# Patient Record
Sex: MALE | Race: WHITE | NOT HISPANIC OR LATINO | Employment: OTHER | URBAN - METROPOLITAN AREA
[De-identification: names, ages, dates, MRNs, and addresses within clinical notes are randomized per-mention and may not be internally consistent; named-entity substitution may affect disease eponyms.]

---

## 2019-02-12 ENCOUNTER — HOSPITAL ENCOUNTER (OUTPATIENT)
Facility: MEDICAL CENTER | Age: 59
End: 2019-02-12
Attending: INTERNAL MEDICINE | Admitting: INTERNAL MEDICINE
Payer: COMMERCIAL

## 2019-03-21 ENCOUNTER — APPOINTMENT (OUTPATIENT)
Dept: NEUROLOGY | Facility: MEDICAL CENTER | Age: 59
End: 2019-03-21
Payer: COMMERCIAL

## 2019-06-20 ENCOUNTER — HOSPITAL ENCOUNTER (OUTPATIENT)
Dept: LAB | Facility: MEDICAL CENTER | Age: 59
End: 2019-06-20
Attending: PSYCHIATRY & NEUROLOGY
Payer: COMMERCIAL

## 2019-06-20 ENCOUNTER — HOSPITAL ENCOUNTER (OUTPATIENT)
Dept: RADIOLOGY | Facility: MEDICAL CENTER | Age: 59
End: 2019-06-20

## 2019-06-20 ENCOUNTER — OFFICE VISIT (OUTPATIENT)
Dept: NEUROLOGY | Facility: MEDICAL CENTER | Age: 59
End: 2019-06-20
Payer: COMMERCIAL

## 2019-06-20 VITALS
HEART RATE: 100 BPM | BODY MASS INDEX: 31.15 KG/M2 | RESPIRATION RATE: 16 BRPM | TEMPERATURE: 97.1 F | DIASTOLIC BLOOD PRESSURE: 58 MMHG | OXYGEN SATURATION: 93 % | HEIGHT: 72 IN | SYSTOLIC BLOOD PRESSURE: 102 MMHG | WEIGHT: 230 LBS

## 2019-06-20 DIAGNOSIS — H53.2 DIPLOPIA: ICD-10-CM

## 2019-06-20 DIAGNOSIS — Z86.73 HISTORY OF STROKE: ICD-10-CM

## 2019-06-20 DIAGNOSIS — F10.11 HISTORY OF ALCOHOL ABUSE: ICD-10-CM

## 2019-06-20 DIAGNOSIS — M06.00 SERONEGATIVE RHEUMATOID ARTHRITIS (HCC): ICD-10-CM

## 2019-06-20 DIAGNOSIS — G62.9 NEUROPATHY: ICD-10-CM

## 2019-06-20 DIAGNOSIS — R20.9 SENSORY DISTURBANCE: ICD-10-CM

## 2019-06-20 LAB
CRP SERPL HS-MCNC: 3.7 MG/DL (ref 0–0.75)
ERYTHROCYTE [SEDIMENTATION RATE] IN BLOOD BY WESTERGREN METHOD: 18 MM/HOUR (ref 0–20)
HIV 1+2 AB+HIV1 P24 AG SERPL QL IA: NON REACTIVE
VIT B12 SERPL-MCNC: 663 PG/ML (ref 211–911)

## 2019-06-20 PROCEDURE — 84165 PROTEIN E-PHORESIS SERUM: CPT

## 2019-06-20 PROCEDURE — 84160 ASSAY OF PROTEIN ANY SOURCE: CPT

## 2019-06-20 PROCEDURE — 83921 ORGANIC ACID SINGLE QUANT: CPT

## 2019-06-20 PROCEDURE — 82607 VITAMIN B-12: CPT

## 2019-06-20 PROCEDURE — 87389 HIV-1 AG W/HIV-1&-2 AB AG IA: CPT

## 2019-06-20 PROCEDURE — 86334 IMMUNOFIX E-PHORESIS SERUM: CPT

## 2019-06-20 PROCEDURE — 86038 ANTINUCLEAR ANTIBODIES: CPT

## 2019-06-20 PROCEDURE — 99205 OFFICE O/P NEW HI 60 MIN: CPT | Performed by: PSYCHIATRY & NEUROLOGY

## 2019-06-20 PROCEDURE — 85652 RBC SED RATE AUTOMATED: CPT

## 2019-06-20 PROCEDURE — 82784 ASSAY IGA/IGD/IGG/IGM EACH: CPT

## 2019-06-20 PROCEDURE — 86039 ANTINUCLEAR ANTIBODIES (ANA): CPT

## 2019-06-20 PROCEDURE — 86140 C-REACTIVE PROTEIN: CPT

## 2019-06-20 PROCEDURE — 36415 COLL VENOUS BLD VENIPUNCTURE: CPT

## 2019-06-20 RX ORDER — GABAPENTIN 400 MG/1
400 CAPSULE ORAL 2 TIMES DAILY
COMMUNITY

## 2019-06-20 RX ORDER — PREDNISONE 20 MG/1
TABLET ORAL
COMMUNITY
Start: 2019-04-03 | End: 2019-06-20

## 2019-06-20 RX ORDER — ATORVASTATIN CALCIUM 80 MG/1
TABLET, FILM COATED ORAL
COMMUNITY
Start: 2019-04-25 | End: 2019-06-20

## 2019-06-20 ASSESSMENT — PATIENT HEALTH QUESTIONNAIRE - PHQ9: CLINICAL INTERPRETATION OF PHQ2 SCORE: 0

## 2019-06-20 ASSESSMENT — ENCOUNTER SYMPTOMS
FALLS: 0
ABDOMINAL PAIN: 0
HALLUCINATIONS: 0
BRUISES/BLEEDS EASILY: 0
EYE DISCHARGE: 0
SORE THROAT: 0
FEVER: 0
SHORTNESS OF BREATH: 0
WEIGHT LOSS: 1

## 2019-06-20 NOTE — PROGRESS NOTES
"Chief Complaint   Patient presents with   • New Patient     Neuropathy     Patient is referred by Dr. Anh Hall for initial consult.    History of present illness:  Dani Sarmiento 59 y.o. male presents today for sensations in the lower extremities.   History is obtained from patient and significant other.  and Patient is accompanied by wife Shruti. . Patient is a scattered and limited historian.  Him and his wife tend to disagree at times during history taking.    Duration/timing: as below  Context:   Neuropathy: he has seen Dr. Gayle ~ 2018, now retiring. He has seen Dr. Damon in [place]. Onset of symptoms 2016, started at the bottom of the toes like pins and needles, stable in location until now. He used to drinks 4 beers a day for at least 10 years.    TIA/CVA: 8/2016, he was suffering with vertigo for uncertain amount. Patient was having double vision (one on top of the other, binocular) felt like shade was coming down on his eye (saw ophthalmologist, cleared) lasting for 3 minutes about the same time, about 4 episodes a week, without triggers, can improve with \"stretching his eyes\". He was at PT experiencing some confusion (uncertain about things) lasting for 1 hour.  Diplopia not  fatigable.  Location: Bilateral lower extremity; underneath the toes  Quality: As above  Severity: Mild neuropathy, mild to moderate stroke  Modifying factors: None  Associated signs/symptoms: additional sensations in the bilateral arms and legs described as \"sensitivity to cold\" like things that shouldn't be cold will feel cold. When he touches his sheets, he feels uncomfortable. Onset about 2017, gradual onset, started in the feet and legs and arms and bottom of the neck at the same time, sparing his face. Present all the time. Hands feel like cold all the time. He cannot get an erection for 2 years but he can ejaculate. +\"Swallowing difficulties\" bringing up gooey mucous, needing to chew his food more?. He is in bed 20 " hours a day because he doesn't feel well - tingling in his arms and legs keeps him there. LOC once walking to bathroom 2017.  Denies: bladder incontinence, bowel incontinence, dizziness, headaches, head trauma , weakness, other numbness/tingling, speech disturbance, incoordination, hearing loss, hallucinations, seizures, falls, family hx seizure, febrile seizure as child, snoring/apnea during sleep and HIV or syphilis risk factors, inability to sweat, pain    Patient has tried:  -Gabapentin - 400mg BID, started in 2018, improved by 80%    Past medical history:   Past Medical History:   Diagnosis Date   • Anxiety    • GERD (gastroesophageal reflux disease)    • Hyperlipidemia      Past surgical history:   History reviewed. No pertinent surgical history.    Family history:   Family History   Problem Relation Age of Onset   • Hyperlipidemia Mother    • Rheumatologic Disease Father        Social history:   Social History   • Marital status:      Social History Main Topics   • Smoking status: Never Smoker   • Smokeless tobacco: Never Used   • Alcohol use Yes      Comment: hx of EtOH abuse - 2018   • Drug use: No   • Sexual activity: Yes     Partners: Female     Current medications:   Current Outpatient Prescriptions   Medication   • gabapentin (NEURONTIN) 400 MG Cap   • omeprazole (PRILOSEC) 20 MG delayed-release capsule   • aspirin EC (ECOTRIN) 325 MG Tablet Delayed Response   • atorvastatin (LIPITOR) 80 MG tablet     No current facility-administered medications for this visit.        Medication Allergy:  Allergies   Allergen Reactions   • Penicillins        Review of Systems   Constitutional: Positive for weight loss. Negative for fever.        20 lbs in 2 months   HENT: Negative for sore throat.    Eyes: Negative for discharge.   Respiratory: Negative for shortness of breath.    Cardiovascular: Negative for leg swelling.   Gastrointestinal: Negative for abdominal pain.   Genitourinary: Negative for dysuria.    Musculoskeletal: Negative for falls.   Skin: Negative for rash.   Neurological:        As per HPI   Endo/Heme/Allergies: Does not bruise/bleed easily.   Psychiatric/Behavioral: Negative for hallucinations.       Physical examination:   Vitals:    06/20/19 1303   BP: 102/58   BP Location: Left arm   Patient Position: Sitting   BP Cuff Size: Adult   Pulse: 100   Resp: 16   Temp: 36.2 °C (97.1 °F)   TempSrc: Temporal   SpO2: 93%   Weight: 104.3 kg (230 lb)   Height: 1.829 m (6')     General: Patient in well nourished in no apparent distress.  Eyes: Ophthalmoscopic examination performed but discs cannot be visualized well enough to characterize bilaterally.  HENT: Normocephalic, atraumatic. Mallapatic score 3  Cardiovascular: No lower extremity edema.  Respiratory: Normal respiratory effort.   Skin: No appreciable signs of acute rashes or bruising.   Musculoskeletal: No signs of joint or muscle swelling.   Psychiatric: Pleasant.     NEUROLOGICAL EXAM:   Mental status: Awake, alert and fully oriented to person, place, time and situation. Normal attention, concentration and fund of knowledge for education level.   Speech and language: Speech is fluent without errors and clear.  Cranial nerve exam:  II: Pupils are equally round and reactive to light. Visual fields are intact by confrontation.  III, IV, VI: EOMI, no diplopia, no ptosis.  V: Sensation to light touch is normal over V1-3 distributions bilaterally.  .  VII: Facial movements are symmetrical. There is no facial droop. .  VIII: Hearing intact to soft speech and finger rub bilaterally  IX: Palate elevates symmetrically, uvula is midline. Dysarthria is not present.  XI: Shoulder shrug are symmetrical and strong.   XII: Tongue protrudes midline.    Motor exam:  Muscle tone is normal in all 4 limbs. and No abnormal movements appreciated.    Muscle strength:     Right  Left  Deltoid   5/5  5/5      Biceps   5/5  5/5  Triceps  5/5  5/5   Wrist  extensors 5/5  5/5  Wrist flexors  5/5  5/5     5/5  5/5  Interossei  5/5  5/5  Thenar (APB)  NT/5  NT/5   Hip flexors  5/5  5/5  Quadriceps  5/5  5/5    Hamstrings  5/5  5/5  Dorsiflexors  5/5  5/5  Plantarflexors  5/5  5/5  Toe extension  NT/5  NT/5  NT = not tested    Sensory exam:  Intact to Light touch, Pinprick, Temperature, Vibration and Proprioception in bilateral upper and lower extremity.    Deep tendon reflexes:       Right  Left  Biceps   1/4  1/4  Triceps  1/4  1/4  Brachioradialis 1/4  1/4  Knee jerk  3/4  3/4  Ankle jerk  0/4  0/4   bilateral toes are downgoing to plantar stimulation..    Coordination: shows a normal finger-nose-finger and heel to shin bilaterally.   Gait: Casual gait is normal., Heel walk is normal. and Toe walk is normal.      ANCILLARY DATA REVIEWED:   Labs:  EMG/NCS results were requested and received from Dr. Gayle: Bilateral lower extremities and left upper extremity were evaluated.  Bilateral sural sensory responses were unobtainable.  Otherwise fairly symmetric common peroneal responses and posterior tibial responses.  Left upper extremity NCS of the median motor nerves were unremarkable.  EMG was unremarkable.    I have personally reviewed the patient's waveforms and values.      Imaging:   MRI C-spine without contrast dated October 2018 performed for radiculopathy: Mild diffuse disc bulge at C5-C6 and C6-C7.  Congenitally small central canal with superimposed acquired degenerative changes resulting in slight further encroachment of the central canal at C5-C6.    MRI brain with and without contrast dated August 2016: Performed for ataxia, diplopia, amaurosis fugax, episodic confusion; small focus of diffusion restriction within the right corona radiata raising concern for small acute lacunar infarct.  Mild supratentorial parenchymal atrophy with moderate hyperintense subcortical and periventricular white matter foci most consistent with chronic micro vascular ischemic  change.  Mild parous nasal sinus disease    Carotid bilateral ultrasound August 2016: No significant carotid stenosis    CT head without contrast August 2016, normal    Records reviewed: Referral note personally reviewed.  Patient referred by Dr. Anh Padron.  He is having abnormal sensations in his bilateral lower extremities associated with possible diplopia.  He feels unsteady.  B12 in 2016 was 297.  Rheumatology referral notes also reviewed.  Patient had a recent EMG.  There is a history of seronegative rheumatoid arthritis, methotrexate was recommended but declined.  He has been reportedly bedbound for over a year due to not feeling good.  Patient is a recovering alcoholic.  Possible history of TIA and stroke.        ASSESSMENT AND PLAN:    1. Sensory disturbance: Uncertain etiology.  MRI brain in August 2016 2016 moderate periventricular white matter disease, small stroke; MRI C-spine October 2018 with mild diffuse disc bulge at C5-C7 without spinal cord compromise; normal EMG/NCS April 2018.  Clinical history is not suspicious for allodynia or small fiber neuropathy and seems separate from problem #2.  Neck and brain imaging does not explain his symptoms.  Symptoms not localized to the T-spine.  It is unusual that he states this is the reason keeping him in bed for 20 hours a day.  Cannot rule out somatization. I am his third neurologist - for multiple reasons.  Low suspicion for seizure given the persistent symptoms.  -Repeat EMG/NCS as below to evaluate for possible progression  -Likely monitor long term    2. Neuropathy (HCC): Distal, bottom of the toes bilaterally, suspicious for small fiber neuropathy given his symptoms and physical exam.  Given his chronic history of heavy alcohol use this is likely the etiology however will complete neuropathy work-up as below for reversible causes.  Patient reportedly had an EMG/NCS performed by Dr. Gayle which was unremarkable in April 2018.  MRI brain with  without contrast in August 2016 performed for multiple symptoms such as ataxia diplopia episodic infusions showed a small area of restricted diffusion in the right corona radiata which may indicate a small infarct however would not account for all of his symptoms as described in HPI.  He has not also otherwise unremarkable neurological exam.  No clear red flag symptoms reported.  See problem #1.  - Immunoelectrophoresis, Serum (Immunofixation); Future  - HIV AG/AB COMBO ASSAY SCREENING; Future  - VITAMIN B12; Future  - METHYLMALONIC ACID, SERUM; Future  - REFERRAL TO NEURODIAGNOSTICS (EEG,EP,EMG/NCS/DBS) Modality Requested: EMG/NCS-Comment Extremities  - WESTERGREN SED RATE; Future  - HUY IGG PRASANNA W/RFLX TO HUY IGG IFA; Future  - CRP QUANTITIVE (NON-CARDIAC); Future  -Continue gabapentin (NEURONTIN) 400 MG Cap; Take 400 mg by mouth 2 times a day.    3. History of stroke: Punctate infarct in the right corona radiata.  Reportedly worked up prior.  -Continue aspirin, atorvastatin    4. Diplopia, reportedly binocular: Not consistent with neuromuscular junction disorder.  No strabismus.  Already seen ophthalmology.  Monitor.    5. History of alcohol abuse: Abstinent since 2018.  Encouragement provided.    6. Seronegative rheumatoid arthritis (HCC): Per report, pending rheumatology evaluation, reportedly soren high CRP and ESR and intermittent joint pain that would improve with prednisone which is nonspecific response.      FOLLOW-UP: Return for after EMG.  EDUCATION AND COUNSELING:  -I personally discussed the following with the patient:   Risks/benefits/side effects/alternatives of medication including but not limited to drowsiness, sedation, dizziness, increased risk for falls, weight changes, renal dysfunction, increased risk for depression, anxiety, suicide, psychosis and mood changes and avoid abrupt cessation of medication., Diagnosis, prognosis, and treatment options discussed with patient at length.  , Recommend  regular exercise, proper hydration, healthy diet and stress reduction. , The above procedure risks/benefits/side effects/alternatives., Usefulness of anti-platelets in secondary stroke prevention was discussed. The side effects, including increased risk for bleeding (both traumatic and spontaneous) were reviewed with the patient at length. , Recommended goal for LDL is 70 or less. Side effects of statin, including idiosyncratic reactions as well as more common myalgias, and liver injury  were discussed. Monitoring of LFT's will be deferred to the patient's primary care physician. , Secondary stroke prevention education was provided, including; lifestyle modification with healthy diet, and exercise, as well as recommendations for optimal control of risk factors. , Compliance with medications was discussed in detail.  and Differential diagnosis, possible psychiatric evaluation.  Red flag symptoms were personally discussed with patient and wife, and when to seek emergency care and evaluation.    The patient understands and agrees that due to the complexity of his/her diagnosis, results of any testing and further recommendations will typically be discussed/made during a face to face encounter in my office. The patient and/or family further understands it is their responsibility to keep proper follow up.     Disclaimer  This dictation was created using voice recognition software. I have made every reasonable attempt to avoid dictation errors, but this document may contain an error not identified before finalizing. If the error changes the accuracy of the document, I would appreciate it being brought to my attention. Thank you very much.     Anh Prince MD  Neurology, Neurophysiology  [facility]

## 2019-06-22 LAB — NUCLEAR IGG SER QL IA: DETECTED

## 2019-06-23 LAB
ALBUMIN SERPL-MCNC: 3.29 G/DL (ref 3.75–5.01)
ALPHA1 GLOB SERPL ELPH-MCNC: 0.54 G/DL (ref 0.19–0.46)
ALPHA2 GLOB SERPL ELPH-MCNC: 0.99 G/DL (ref 0.48–1.05)
ANA INTERPRETIVE COMMENT Q5143: ABNORMAL
ANA PATTERN Q5144: ABNORMAL
ANA TITER Q5145: ABNORMAL
ANTINUCLEAR ANTIBODY (ANA), HEP-2, IGG Q5142: DETECTED
B-GLOBULIN SERPL ELPH-MCNC: 0.9 G/DL (ref 0.48–1.1)
GAMMA GLOB SERPL ELPH-MCNC: 1.28 G/DL (ref 0.62–1.51)
IGA SERPL-MCNC: 298 MG/DL (ref 68–408)
IGG SERPL-MCNC: 1270 MG/DL (ref 768–1632)
IGM SERPL-MCNC: 62 MG/DL (ref 35–263)
INTERPRETATION SERPL IFE-IMP: ABNORMAL
INTERPRETATION SERPL IFE-IMP: ABNORMAL
METHYLMALONATE SERPL-SCNC: 0.18 UMOL/L (ref 0–0.4)
PATHOLOGY STUDY: ABNORMAL
PROT SERPL-MCNC: 7 G/DL (ref 6–8.3)

## 2019-06-25 ENCOUNTER — TELEPHONE (OUTPATIENT)
Dept: NEUROLOGY | Facility: MEDICAL CENTER | Age: 59
End: 2019-06-25

## 2019-06-25 NOTE — TELEPHONE ENCOUNTER
----- Message from Anh Prince M.D. sent at 6/24/2019 12:44 PM PDT -----  No monoclonal proteins.  B12, MMA normal.  HIV negative.  ESR normal.    CRP elevated at 3.70 with elevated HUY antibody with very high titer. Pending Rheum evaluation.  EMG/NCS pending, if abnormal or concerning findings such as mononeuritis multiplex, will pursue further workup for neuropathy - ANCA.

## 2019-07-02 NOTE — TELEPHONE ENCOUNTER
Spoke to wife Shruti. Relayed Dr. Prince's message regarding results. She understands - also confirmed EMG appt.

## 2019-07-18 ENCOUNTER — NON-PROVIDER VISIT (OUTPATIENT)
Dept: NEUROLOGY | Facility: MEDICAL CENTER | Age: 59
End: 2019-07-18
Payer: COMMERCIAL

## 2019-07-18 DIAGNOSIS — M06.00 SERONEGATIVE RHEUMATOID ARTHRITIS (HCC): ICD-10-CM

## 2019-07-18 DIAGNOSIS — Z86.73 HISTORY OF STROKE: ICD-10-CM

## 2019-07-18 DIAGNOSIS — G62.9 NEUROPATHY: ICD-10-CM

## 2019-07-18 PROCEDURE — 95911 NRV CNDJ TEST 9-10 STUDIES: CPT | Performed by: PSYCHIATRY & NEUROLOGY

## 2019-07-18 PROCEDURE — 95886 MUSC TEST DONE W/N TEST COMP: CPT | Performed by: PSYCHIATRY & NEUROLOGY

## 2019-07-18 NOTE — PROCEDURES
NERVE CONDUCTION STUDIES AND ELECTROMYOGRAPHY REPORT  [facility]  07/18/19           IMPRESSION:  This is a normal electrodiagnostic exam. There is no evidence of left cervical or lumbosacral radiculopathy, polyneuropathy, or myopathy.      Anh Prince MD  Neurology - Neurophysiology  [facility]      REASON FOR REFERRAL:  Mr. Dani Sarmiento 59 y.o. referred by Dr. Anh Prince for evaluation of sensory disturbance.  Patient describes burning and allodynia over all extremities.    Height: 5'11  Weight: 195 lbs      ELECTRODIAGNOSTIC EXAMINATION:  Nerve conduction studies (NCS) and electromyography (EMG) are utilized to evaluate direct or indirect damage to the peripheral nervous system. NCS are performed to measure the nerve(s) response(s) to electrostimulation across a given nerve segment. EMG evaluates the passive and active electrical activity of the muscle(s) in question.  Muscles are innervated by specific peripheral nerves and roots. Often times, several nerves the muscle to be examined in order to determine the presence or absence of the disease process. Furthermore, nerves and muscles may need to be tested in a [id number] comparison, as well as in additional extremities, as this may be crucial in characterizing the extent of the disease process, which may be diffuse or isolated and of varying degree of severity. The extent of the neurodiagnostic exam is justified as it may help arrive to a proper diagnosis, which ultimately may contribute to better management of the patient. Therefore, the nerves to muscles examined during the study were medically necessary.    Unless otherwise noted, temperature of the extremity(s) study was monitored before and during the examination and remained between 32 and 36 degrees C for the upper extremities, and between 30 and 36 degrees C for the lower extremities. The patient tolerated testing well, without any complications.       NERVE  CONDUCTION STUDY SUMMARY:  Selected nerves of the bilateral lower extremity and left upper extremity are studied.    Normal left median sensory motor responses.  Normal left ulnar sensory and motor responses.  Normal left sural sensory response.  Normal bilateral common peroneal motor responses at the extensor digitorum brevis.  Normal right tibial motor response abductor hallucis brevis.  Normal left tibial motor response abductor hallucis brevis.  Repeat testing on the left tibial motor was limited due to patient intolerance.    NEEDLE EMG SUMMARY:  Concentric needle study of selected left upper and lower extremity muscles is performed.     Insertion is normal in all muscles sampled. With activation, there are normal morphology (amplitude/duration) motor unit action potentials firing with normal recruitment.       PATIENT DATA TABLES  Nerve Conduction Studies     Stim Site NR Onset (ms) Norm Onset (ms) O-P Amp (µV) Norm O-P Amp Site1 Site2 Delta-P (ms) Dist (cm) Neptali (m/s) Norm Neptali (m/s)   Left Median Anti Sensory (2nd Digit)   Wrist    2.3 <3.6 45.1 >10 Wrist 2nd Digit 3.9 14.0 *36 >50   Left Sural Anti Sensory (Lat Mall)   Calf    3.3 <4.6 9.2 >4 Calf Lat Mall 3.8 14.0 *37 >40   Left Ulnar Anti Sensory (5th Digit)   Wrist    3.0 <3.1 22.0 >10 Wrist 5th Digit 4.3 14.0 *33 >50        Stim Site NR Onset (ms) Norm Onset (ms) O-P Amp (mV) Norm O-P Amp Site1 Site2 Delta-0 (ms) Dist (cm) Neptali (m/s) Norm Neptali (m/s)   Left Median Motor (Abd Poll Brev)   Wrist    3.5 <4 10.4 >6 Elbow Wrist 5.1 25.0 *49 >50   Elbow    8.6  9.4          Left Peroneal EDB Motor (Ext Dig Brev)   Ankle    4.1 <6 4.8 >2.5 B Fib Ankle 7.8 40.0 51 >40   B Fib    11.9  4.2              12.7  3.0          Right Peroneal EDB Motor (Ext Dig Brev)   Ankle    6.0 <6 3.8 >2.5 B Fib Ankle 8.7 39.0 45 >40   B Fib    14.7  3.6  Poplt B Fib 0.9 0.0     Poplt    13.8  2.5          Left Tibial Motor (Abd Vásquez Brev)   Ankle    3.6 <6 6.6 >4 Knee Ankle 9.4 34.0  *36 >40   Knee    13.0  7.1          Site 3 *NR             Right Tibial Motor (Abd Vásquez Brev)   Ankle    4.1 <6 9.7 >4 Knee Ankle 8.2 43.0 52 >40   Knee    12.3  8.4          Left Ulnar Motor (Abd Dig Min)   Wrist    3.0 <3.1 8.8 >7 B Elbow Wrist 4.2 21.0 50 >50   B Elbow    7.2  8.3  A Elbow B Elbow 2.3 0.0     A Elbow    9.5  8.2                                 Electromyography     Side Muscle Nerve Root Ins Act Fibs Psw Amp Dur Poly Recrt Int Pat Comment   Left AntTibialis Dp Br Fibular L4-5 Nml Nml Nml Nml Nml 0 Nml Nml    Left Gastroc Tibial S1-2 Nml Nml Nml Nml Nml 0 Nml Nml    Left VastusLat Femoral L2-4 Nml Nml Nml Nml Nml 0 Nml Nml    Left GluteusMed SupGluteal L5-S1 Nml Nml Nml Nml Nml 0 Nml Nml    Left VastusMed Femoral L2-4 Nml Nml Nml Nml Nml 0 Nml Nml    Left 1stDorInt Ulnar C8-T1 Nml Nml Nml Nml Nml 0 Nml Nml    Left PronatorTeres Median C6-7 Nml Nml Nml Nml Nml 0 Nml Nml    Left Biceps Musculocut C5-6 Nml Nml Nml Nml Nml 0 Nml Nml    Left Triceps Radial C6-7-8 Nml Nml Nml Nml Nml 0 Nml Nml    Left Deltoid Axillary C5-6 Nml Nml Nml Nml Nml 0 Nml Nml

## 2019-10-03 ENCOUNTER — DOCUMENTATION (OUTPATIENT)
Dept: NEUROLOGY | Facility: MEDICAL CENTER | Age: 59
End: 2019-10-03

## 2019-10-03 NOTE — PROGRESS NOTES
Patient's wife reached out regarding further workup. Patient has high HUY, and I had suspicion for small fiber neuropathy. Skin biopsy for evaluation and workup. I remember discussing this when them during the EMG study. I also recommended f/u, which they need to schedule.    Anh Prince MD  Neurology - Neurophysiology  [facility]